# Patient Record
Sex: FEMALE | Race: WHITE | NOT HISPANIC OR LATINO | Employment: OTHER | URBAN - NONMETROPOLITAN AREA
[De-identification: names, ages, dates, MRNs, and addresses within clinical notes are randomized per-mention and may not be internally consistent; named-entity substitution may affect disease eponyms.]

---

## 2018-02-14 ENCOUNTER — APPOINTMENT (OUTPATIENT)
Dept: OCCUPATIONAL MEDICINE | Facility: OTHER | Age: 58
End: 2018-02-14

## 2018-02-19 ENCOUNTER — APPOINTMENT (OUTPATIENT)
Dept: OCCUPATIONAL MEDICINE | Facility: OTHER | Age: 58
End: 2018-02-19

## 2020-07-19 ENCOUNTER — HOSPITAL ENCOUNTER (EMERGENCY)
Facility: HOSPITAL | Age: 60
Discharge: HOME | End: 2020-07-19
Attending: EMERGENCY MEDICINE | Admitting: EMERGENCY MEDICINE
Payer: COMMERCIAL

## 2020-07-19 VITALS
SYSTOLIC BLOOD PRESSURE: 160 MMHG | DIASTOLIC BLOOD PRESSURE: 99 MMHG | TEMPERATURE: 97 F | RESPIRATION RATE: 20 BRPM | HEART RATE: 94 BPM | OXYGEN SATURATION: 99 %

## 2020-07-19 DIAGNOSIS — Z79.4 TYPE 2 DIABETES MELLITUS WITH OTHER SPECIFIED COMPLICATION, WITH LONG-TERM CURRENT USE OF INSULIN (H): ICD-10-CM

## 2020-07-19 DIAGNOSIS — E11.69 TYPE 2 DIABETES MELLITUS WITH OTHER SPECIFIED COMPLICATION, WITH LONG-TERM CURRENT USE OF INSULIN (H): ICD-10-CM

## 2020-07-19 DIAGNOSIS — H60.21 ACUTE MALIGNANT OTITIS EXTERNA OF RIGHT EAR: ICD-10-CM

## 2020-07-19 PROCEDURE — 99283 EMERGENCY DEPT VISIT LOW MDM: CPT

## 2020-07-19 PROCEDURE — 25000132 ZZH RX MED GY IP 250 OP 250 PS 637: Performed by: EMERGENCY MEDICINE

## 2020-07-19 PROCEDURE — 99282 EMERGENCY DEPT VISIT SF MDM: CPT | Mod: Z6 | Performed by: EMERGENCY MEDICINE

## 2020-07-19 PROCEDURE — 99282 EMERGENCY DEPT VISIT SF MDM: CPT

## 2020-07-19 RX ORDER — SIMVASTATIN 20 MG
20 TABLET ORAL AT BEDTIME
COMMUNITY

## 2020-07-19 RX ORDER — SULFAMETHOXAZOLE/TRIMETHOPRIM 800-160 MG
1 TABLET ORAL ONCE
Status: COMPLETED | OUTPATIENT
Start: 2020-07-19 | End: 2020-07-19

## 2020-07-19 RX ORDER — CIPROFLOXACIN AND DEXAMETHASONE 3; 1 MG/ML; MG/ML
4 SUSPENSION/ DROPS AURICULAR (OTIC) 2 TIMES DAILY
Qty: 1 BOTTLE | Refills: 0 | Status: SHIPPED | OUTPATIENT
Start: 2020-07-19 | End: 2020-07-19

## 2020-07-19 RX ORDER — SULFAMETHOXAZOLE/TRIMETHOPRIM 800-160 MG
1 TABLET ORAL 2 TIMES DAILY
Qty: 14 TABLET | Refills: 0 | Status: SHIPPED | OUTPATIENT
Start: 2020-07-19

## 2020-07-19 RX ORDER — SULFAMETHOXAZOLE/TRIMETHOPRIM 800-160 MG
1 TABLET ORAL 2 TIMES DAILY
Status: DISCONTINUED | OUTPATIENT
Start: 2020-07-19 | End: 2020-07-19

## 2020-07-19 RX ORDER — CIPROFLOXACIN AND DEXAMETHASONE 3; 1 MG/ML; MG/ML
4 SUSPENSION/ DROPS AURICULAR (OTIC) ONCE
Status: DISCONTINUED | OUTPATIENT
Start: 2020-07-19 | End: 2020-07-19 | Stop reason: HOSPADM

## 2020-07-19 RX ORDER — CIPROFLOXACIN AND DEXAMETHASONE 3; 1 MG/ML; MG/ML
4 SUSPENSION/ DROPS AURICULAR (OTIC) 2 TIMES DAILY
Qty: 1 BOTTLE | Refills: 0 | Status: SHIPPED | OUTPATIENT
Start: 2020-07-19

## 2020-07-19 RX ORDER — LEVOFLOXACIN 500 MG/1
500 TABLET, FILM COATED ORAL DAILY
Qty: 7 TABLET | Refills: 0 | Status: SHIPPED | OUTPATIENT
Start: 2020-07-19

## 2020-07-19 RX ORDER — SULFAMETHOXAZOLE/TRIMETHOPRIM 800-160 MG
1 TABLET ORAL 2 TIMES DAILY
Qty: 14 TABLET | Refills: 0 | Status: SHIPPED | OUTPATIENT
Start: 2020-07-19 | End: 2020-07-19

## 2020-07-19 RX ORDER — LEVOFLOXACIN 750 MG/1
750 TABLET, FILM COATED ORAL ONCE
Status: COMPLETED | OUTPATIENT
Start: 2020-07-19 | End: 2020-07-19

## 2020-07-19 RX ORDER — LEVOFLOXACIN 500 MG/1
500 TABLET, FILM COATED ORAL DAILY
Qty: 7 TABLET | Refills: 0 | Status: SHIPPED | OUTPATIENT
Start: 2020-07-19 | End: 2020-07-19

## 2020-07-19 RX ADMIN — LEVOFLOXACIN 750 MG: 750 TABLET, FILM COATED ORAL at 08:36

## 2020-07-19 RX ADMIN — SULFAMETHOXAZOLE AND TRIMETHOPRIM 1 TABLET: 800; 160 TABLET ORAL at 08:36

## 2020-07-19 ASSESSMENT — ENCOUNTER SYMPTOMS
COUGH: 0
EYE ITCHING: 0
FEVER: 0
COLOR CHANGE: 1
CHILLS: 0
WEAKNESS: 0
SHORTNESS OF BREATH: 0
FACIAL SWELLING: 1

## 2020-07-19 NOTE — DISCHARGE INSTRUCTIONS
It is ok to take ibuprofen. Finish the antibiotics. Return if more swelling or fever, stiff neck or worse. Advise taking the earrings out. Maintain a very good diet for your DM to keep glucose as low as possible and check three times a day  Use the ciprodex in the ear twice a day to saturate the wick. Have your doctor remove wick at the end of the week.

## 2020-07-19 NOTE — ED AVS SNAPSHOT
[facility]  [address] [address]  Phone:  [phone]                                    Linda Benoit   MRN: [id number]    Department:  [facility]   Date of Visit:  7/19/2020           After Visit Summary Signature Page    I have received my discharge instructions, and my questions have been answered. I have discussed any challenges I see with this plan with the nurse or doctor.    ..........................................................................................................................................  Patient/Patient Representative Signature      ..........................................................................................................................................  Patient Representative Print Name and Relationship to Patient    ..................................................               ................................................  Date                                   Time    ..........................................................................................................................................  Reviewed by Signature/Title    ...................................................              ..............................................  Date                                               Time          22EPIC Rev 08/18

## 2020-07-19 NOTE — ED NOTES
Will take meds at home after eating. Aware that Ear gtts can be picked up at [facility] when they open. States pain is 5/10. Aware she is to have the ear wick removed by lucie AQUINO at the end of the week.

## 2020-07-19 NOTE — ED NOTES
Per in patient pharmacy we are out of ciprodex ear drops; provider aware.  Patient to start after discharge.

## 2020-07-19 NOTE — ED PROVIDER NOTES
History     Chief Complaint   Patient presents with     Ear Drainage     HPI  Linda Benoit is a 60 year old female who ends with right ear swelling.  There is been some crusty drainage this morning.  She normally takes Aleve but has not taken any yet today.  She does have ibuprofen at home.  She is an NIDDM patient who is on Jardiance and metformin.  Sugars have not been uncontrolled.  She has some swelling in the neck also.  She has no history of upper facial swelling and there is some decreased hearing.  She has no difficulty opening her jaw.  She is never had osteomyelitis and she has no sore throat or dysphagia.  There is no cough shortness of breath or chest pain noted by the patient she had this same thing several years ago and was placed on systemic antibiotics.  She does have a local physician with whom she can follow-up.    Allergies:  No Known Allergies    Problem List:    There are no active problems to display for this patient.       Past Medical History:    No past medical history on file.    Past Surgical History:    No past surgical history on file.    Family History:    No family history on file.    Social History:  Marital Status:   [2]  Social History     Tobacco Use     Smoking status: Not on file   Substance Use Topics     Alcohol use: Not on file     Drug use: Not on file        Medications:    empagliflozin (JARDIANCE) 10 MG TABS tablet  metFORMIN (GLUCOPHAGE) 1000 MG tablet  omeprazole (PRILOSEC) 20 MG DR capsule  simvastatin (ZOCOR) 20 MG tablet          Review of Systems   Constitutional: Negative for chills and fever.   HENT: Positive for ear discharge, ear pain, facial swelling and hearing loss. Negative for drooling, mouth sores and sneezing.    Eyes: Negative for itching.   Respiratory: Negative for cough and shortness of breath.    Cardiovascular: Negative for chest pain.   Skin: Positive for color change.   Allergic/Immunologic: Positive for immunocompromised state.    Neurological: Negative for weakness.       Physical Exam   BP: 160/99  Pulse: 94  Temp: 97  F (36.1  C)  Resp: 20  SpO2: 99 %      Physical Exam  Vitals signs and nursing note reviewed.   Constitutional:       General: She is in acute distress.      Appearance: She is not toxic-appearing or diaphoretic.   HENT:      Head: Normocephalic.      Left Ear: Tympanic membrane and ear canal normal.      Ears:      Comments: Right tympanic membrane is visible.  However, there is near occlusion of the canal.  The entire pinna is swollen.  There is some crusting.  There are no bleeding sites.     Nose: Nose normal. No rhinorrhea.      Mouth/Throat:      Mouth: Mucous membranes are moist.      Pharynx: Oropharynx is clear. No oropharyngeal exudate or posterior oropharyngeal erythema.   Eyes:      General:         Right eye: No discharge.         Left eye: No discharge.      Extraocular Movements: Extraocular movements intact.      Conjunctiva/sclera: Conjunctivae normal.      Pupils: Pupils are equal, round, and reactive to light.   Neck:      Musculoskeletal: Neck supple. Muscular tenderness present.      Vascular: No carotid bruit.      Comments: Patient has some swelling at the angle of the jaw which measure about 4 cm x 3 cm and indurated.  She has no JVD however.No stridor or dysphonia.  Cardiovascular:      Rate and Rhythm: Normal rate and regular rhythm.      Heart sounds: No murmur. Friction rub present.   Pulmonary:      Effort: No respiratory distress.      Breath sounds: No wheezing or rhonchi.   Chest:      Chest wall: No tenderness.   Abdominal:      General: Abdomen is flat.   Musculoskeletal: Normal range of motion.   Skin:     General: Skin is warm and dry.   Neurological:      General: No focal deficit present.      Mental Status: She is alert. Mental status is at baseline.   Psychiatric:         Mood and Affect: Mood normal.         Thought Content: Thought content normal.         Judgment: Judgment normal.          ED Course        Procedures  With the patient laying in the left lateral decubitus I placed a wick in the right ear.  We will be placing Ciprodex drops in her ear twice daily to saturate.  She can return at any time if she does have the ability to make an appointment see her doctor to get the wick out earlier in the week I will continue her on Levaquin and Bactrim to cover Pseudomonas as well as staph respectively.  I told her if she gets more swelling or cannot swallow or drools or gets shaking chills or fever she needs to return immediately.             pt is to get ciprodex as an outpt as we do not have here.       No results found for this or any previous visit (from the past 24 hour(s)).    Medications   ciprofloxacin-dexamethasone (CIPRODEX) 0.3-0.1 % otic suspension 4 drop (has no administration in time range)   levofloxacin (LEVAQUIN) tablet 750 mg (has no administration in time range)   sulfamethoxazole-trimethoprim (BACTRIM DS) 800-160 MG per tablet 1 tablet (has no administration in time range)       Assessments & Plan (with Medical Decision Making)     I have reviewed the nursing notes.    I have reviewed the findings, diagnosis, plan and need for follow up with the patient.      New Prescriptions    No medications on file     1. 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 Type 2 diabetes mellitus with other specified complication, with long-term current use of insulin (H)          Final diagnoses:   None       7/19/2020   [facility]     Fiordaliza Nicole MD  07/19/20 [id number]

## 2023-09-25 ENCOUNTER — MEDICAL CORRESPONDENCE (OUTPATIENT)
Dept: NUCLEAR MEDICINE | Facility: HOSPITAL | Age: 63
End: 2023-09-25

## 2023-10-02 ENCOUNTER — HOSPITAL ENCOUNTER (OUTPATIENT)
Dept: NUCLEAR MEDICINE | Facility: HOSPITAL | Age: 63
Setting detail: NUCLEAR MEDICINE
Discharge: HOME | End: 2023-10-02
Attending: FAMILY MEDICINE
Payer: COMMERCIAL

## 2023-10-02 ENCOUNTER — HOSPITAL ENCOUNTER (OUTPATIENT)
Dept: CARDIOLOGY | Facility: HOSPITAL | Age: 63
Setting detail: NUCLEAR MEDICINE
Discharge: HOME | End: 2023-10-02
Attending: FAMILY MEDICINE
Payer: COMMERCIAL

## 2023-10-02 DIAGNOSIS — R94.31 ABNORMAL ELECTROCARDIOGRAM: ICD-10-CM

## 2023-10-02 LAB
CV BLOOD PRESSURE: 76 MMHG
CV STRESS MAX HR HE: 95
NUC STRESS EJECTION FRACTION: 73 %
RATE PRESSURE PRODUCT: NORMAL
STRESS ECHO BASELINE DIASTOLIC HE: 90
STRESS ECHO BASELINE HR: 68 BPM
STRESS ECHO BASELINE SYSTOLIC BP: 138
STRESS ECHO CALCULATED PERCENT HR: 61 %
STRESS ECHO LAST STRESS DIASTOLIC BP: 82
STRESS ECHO LAST STRESS SYSTOLIC BP: 118
STRESS ECHO TARGET HR: 157

## 2023-10-02 PROCEDURE — 343N000001 HC RX 343: Performed by: RADIOLOGY

## 2023-10-02 PROCEDURE — 93017 CV STRESS TEST TRACING ONLY: CPT

## 2023-10-02 PROCEDURE — A9500 TC99M SESTAMIBI: HCPCS | Performed by: RADIOLOGY

## 2023-10-02 PROCEDURE — 93018 CV STRESS TEST I&R ONLY: CPT | Performed by: INTERNAL MEDICINE

## 2023-10-02 PROCEDURE — 250N000011 HC RX IP 250 OP 636: Performed by: INTERNAL MEDICINE

## 2023-10-02 PROCEDURE — 93016 CV STRESS TEST SUPVJ ONLY: CPT | Performed by: INTERNAL MEDICINE

## 2023-10-02 PROCEDURE — 78452 HT MUSCLE IMAGE SPECT MULT: CPT

## 2023-10-02 RX ORDER — REGADENOSON 0.08 MG/ML
0.4 INJECTION, SOLUTION INTRAVENOUS ONCE
Status: COMPLETED | OUTPATIENT
Start: 2023-10-02 | End: 2023-10-02

## 2023-10-02 RX ORDER — AMINOPHYLLINE 25 MG/ML
INJECTION, SOLUTION INTRAVENOUS
Status: DISCONTINUED
Start: 2023-10-02 | End: 2023-10-02 | Stop reason: WASHOUT

## 2023-10-02 RX ADMIN — Medication 10.2 MILLICURIE: at 07:14

## 2023-10-02 RX ADMIN — REGADENOSON 0.4 MG: 0.08 INJECTION, SOLUTION INTRAVENOUS at 08:56

## 2023-10-02 RX ADMIN — Medication 31.4 MILLICURIE: at 08:56

## 2025-01-22 ENCOUNTER — TRANSFERRED RECORDS (OUTPATIENT)
Dept: MULTI SPECIALTY CLINIC | Facility: CLINIC | Age: 65
End: 2025-01-22

## 2025-01-22 LAB — RETINOPATHY: NORMAL

## 2025-01-30 ENCOUNTER — TRANSFERRED RECORDS (OUTPATIENT)
Dept: HEALTH INFORMATION MANAGEMENT | Facility: HOSPITAL | Age: 65
End: 2025-01-30

## 2025-01-30 LAB
ALBUMIN (EXTERNAL): 4.5 G/DL (ref 3.5–5)
ALBUMIN (URINE) MG/SPEC: <3 MG/L
ALKALINE PHOSPHATASE (EXTERNAL): 84 U/L (ref 32–104)
ALT SERPL-CCNC: 28 U/L (ref 18–65)
ANION GAP SERPL CALC-SCNC: 8 MMOL/L (ref 5–15)
AST SERPL-CCNC: 19 U/L (ref 10–30)
BILIRUB SERPL-MCNC: 0.3 MG/DL (ref 0.2–1)
BUN SERPL-MCNC: 18 MG/DL (ref 8–23)
CALCIUM (EXTERNAL): 9.7 MG/DL (ref 8.5–10.5)
CHLORIDE (EXTERNAL): 109 MMOL/L (ref 98–107)
CHOLESTEROL (EXTERNAL): 178 MG/DL
CO2 (EXTERNAL): 26 MMOL/L (ref 23–32)
CREATININE (EXTERNAL): 0.5 MG/DL (ref 0.7–1.2)
CREATININE (URINE): 49.3 MG/DL
GFR ESTIMATED (EXTERNAL): >90 ML/MIN/1.73M2
GFR ESTIMATED (IF AFRICAN AMERICAN) (EXTERNAL): ABNORMAL ML/MIN/1.73M2
GLUCOSE (EXTERNAL): 156 MG/DL (ref 70–100)
HBA1C MFR BLD: 8.1 %
HDLC SERPL-MCNC: 48 MG/DL
LDL CHOLESTEROL CALCULATED (EXTERNAL): 104 MG/DL
POTASSIUM (EXTERNAL): 4.5 MMOL/L (ref 3.5–5.1)
PROTEIN TOTAL (EXTERNAL): 6.9 G/DL (ref 6–8)
SODIUM (EXTERNAL): 143 MMOL/L (ref 136–145)
TRIGLYCERIDES (EXTERNAL): 131 MG/DL
TSH SERPL-ACNC: 2.16 UIU/ML (ref 0.35–4.8)

## 2025-04-30 DIAGNOSIS — E11.9 TYPE 2 DIABETES MELLITUS WITHOUT COMPLICATION, WITHOUT LONG-TERM CURRENT USE OF INSULIN (H): Primary | ICD-10-CM

## 2025-04-30 RX ORDER — EMPAGLIFLOZIN 25 MG/1
25 TABLET, FILM COATED ORAL
Qty: 90 TABLET | Refills: 0 | Status: SHIPPED | OUTPATIENT
Start: 2025-04-30

## 2025-04-30 NOTE — TELEPHONE ENCOUNTER
Jardiance       Last Written Prescription Date:  unknown historical   Last Fill Quantity: unknown,   # refills: unknown   Last Office Visit: n/a  Future Office visit:

## 2025-04-30 NOTE — TELEPHONE ENCOUNTER
Sodium Glucose Co-Transport Inhibitor Agents [id number]/30/2025 08:38 AM   Protocol Details Patient has documented A1c within the specified period of time.    Medication is active on med list and the sig matches. RN to manually verify dose and sig if red X/fail.    Recent (6 mo) or future (90 days) visit within the authorizing provider's specialty    Medication indicated for associated diagnosis    Has GFR on file in past 12 months and most recent value is >30    Patient has documented normal Potassium within the last 12 mos.

## 2025-05-20 NOTE — TELEPHONE ENCOUNTER
Levothyroxine (Synthroid/Levothroid) 50 MCG tablet    Last Written Prescription Date:  05/20/2025  Last Fill Quantity: 90,   # refills: 0  Last Office Visit: Cancelled last appointment  Future Office visit:       Routing refill request to provider for review/approval because:  Protocol failed on the Synthroid.

## 2025-05-20 NOTE — TELEPHONE ENCOUNTER
Thyroid Protocol [id number]/20/2025 08:59 AM   Protocol Details Medication is active on med list and the sig matches. RN to manually verify dose and sig if red X/fail.    Recent (12 month) or future (90 days) visit with authorizing provider's specialty (provided they have been seen in the past 15 months)    Medication indicated for associated diagnosis    Normal TSH on file in past 12 months

## 2025-05-21 RX ORDER — LEVOTHYROXINE SODIUM 50 UG/1
TABLET ORAL
Qty: 90 TABLET | Refills: 0 | OUTPATIENT
Start: 2025-05-21

## 2025-05-21 NOTE — TELEPHONE ENCOUNTER
RN adived [facility] to be signing patient medications until patient is seen in this clinic. RN called and updated patient and encouraged her to speak to management at [facility] if she has any issues. Patient verbally stated that she understood.

## 2025-06-02 RX ORDER — SITAGLIPTIN 100 MG/1
TABLET, FILM COATED ORAL
COMMUNITY
Start: 2025-03-04

## 2025-06-02 RX ORDER — NITROGLYCERIN 0.4 MG/1
TABLET SUBLINGUAL
COMMUNITY
Start: 2025-02-20

## 2025-06-02 RX ORDER — TRIAMCINOLONE ACETONIDE 1 MG/G
CREAM TOPICAL
COMMUNITY
Start: 2025-03-19

## 2025-06-02 RX ORDER — FLUOROURACIL 50 MG/G
CREAM TOPICAL
COMMUNITY
Start: 2024-08-06 | End: 2025-06-03

## 2025-06-02 RX ORDER — METOPROLOL SUCCINATE 100 MG/1
TABLET, EXTENDED RELEASE ORAL
COMMUNITY
Start: 2025-04-21

## 2025-06-02 RX ORDER — OMEPRAZOLE 40 MG/1
CAPSULE, DELAYED RELEASE ORAL
COMMUNITY
Start: 2025-04-21

## 2025-06-02 RX ORDER — FUROSEMIDE 40 MG/1
TABLET ORAL
COMMUNITY
Start: 2025-03-22

## 2025-06-02 RX ORDER — ATORVASTATIN CALCIUM 20 MG/1
TABLET, FILM COATED ORAL
COMMUNITY
Start: 2025-05-06

## 2025-06-02 RX ORDER — MUPIROCIN 20 MG/G
OINTMENT TOPICAL
COMMUNITY
Start: 2025-02-25 | End: 2025-06-03

## 2025-06-02 RX ORDER — ESTRADIOL 0.1 MG/G
CREAM VAGINAL
COMMUNITY
Start: 2025-01-30 | End: 2025-06-03

## 2025-06-02 RX ORDER — LEVOTHYROXINE SODIUM 50 UG/1
50 TABLET ORAL
COMMUNITY
Start: 2025-05-21

## 2025-06-02 NOTE — PROGRESS NOTES
"  Assessment & Plan     Type 2 diabetes mellitus without complication, without long-term current use of insulin (H)  Uncontrolled. Last A1C was 8.1, She is on januvia, jardiance and meformin. These are at max doses. She has had several vaginal yeast infections on the jardiance, no severe infection. Never been on GLP1, discussed risk/benefit of these. Recommend diabetes ed. Discussed potential move from januvia to glp1, goal A1C 7 or under.   - Diabetes Education Referral ([facility])  - metFORMIN (GLUCOPHAGE) 1000 MG tablet  Dispense: 180 tablet; Refill: 3    Coronary artery disease due to lipid rich plaque  Angiogram 2023, no stents placed. Followed by [facility]. Will need records. Note she uses lasix 40mg very prn, mostly with travel. No clear hx of CHF>     Hypothyroidism, unspecified  This has been stable. Labs from [facility] reviewed. Dx made on routine labs, no hx of surgery.     GERD, without hemorrhage  On 40mg of prilosec. Previously on 20mg. Consider taper in the future, if able.     Chronic left shoulder pain  Deltoid region. Suspect some shoulder/rotator cuff impingement contributing the deltoid dysfunction. Consider PT. Will set up for ortho, consider injection.   - Orthopedic  Referral    History of nonmelanoma skin cancer  BCC, followed by [facility] every 6 mo    Menopause present / Vitamin D Deficiency  Update dexa. Pt is now on vit D supplement. Will need recheck at annual  - DX Bone Density      BMI  Estimated body mass index is 39.19 kg/m  as calculated from the following:    Height as of this encounter: 1.626 m (5' 4\").    Weight as of this encounter: 103.6 kg (228 lb 4.8 oz).       Follow-up  Return in about 3 months (around 9/3/2025) for Diabetes.    Moncho Mckeon is a 65 year old, presenting for the following health issues:  Diabetes, Lipids, and Hypertension        6/3/2025     1:20 PM   Additional Questions   Roomed by hector hadley     History of Present Illness " "      Reason for visit:  Determine care with new dr   She is taking medications regularly.        Diabetes Follow-up    How often are you checking your blood sugar? One time daily  What time of day are you checking your blood sugars (select all that apply)?  Before and after meals  Have you had any blood sugars above 200?  No  Have you had any blood sugars below 70?  No  What symptoms do you notice when your blood sugar is low?  None  What concerns do you have today about your diabetes? None   Do you have any of these symptoms? (Select all that apply)  Redness, sores, or blisters on feet and Excessive thirst  Have you had a diabetic eye exam in the last 12 months? Yes- Date of last eye exam: January 2025,  Location: [facility]    Hyperlipidemia Follow-Up    Are you regularly taking any medication or supplement to lower your cholesterol?   Yes- lipitor  Are you having muscle aches or other side effects that you think could be caused by your cholesterol lowering medication?  No    Hypertension Follow-up    Do you check your blood pressure regularly outside of the clinic? No   Are you following a low salt diet? Yes  Are your blood pressures ever more than 140 on the top number (systolic) OR more   than 90 on the bottom number (diastolic), for example 140/90? Yes    BP Readings from Last 2 Encounters:   06/04/25 121/80   06/03/25 124/74       Review of Systems  Constitutional, neuro, ENT, endocrine, pulmonary, cardiac, gastrointestinal, genitourinary, musculoskeletal, integument and psychiatric systems are negative, except as otherwise noted.      Objective    /74 (BP Location: Left arm, Patient Position: Sitting, Cuff Size: Adult Regular)   Pulse 64   Temp 97.1  F (36.2  C) (Tympanic)   Resp 18   Ht 1.626 m (5' 4\")   Wt 103.6 kg (228 lb 4.8 oz)   SpO2 96%   BMI 39.19 kg/m    Body mass index is 39.19 kg/m .    Physical Exam   GENERAL: alert and no distress  HENT: ear canals and TM's normal, nose and " mouth without ulcers or lesions  NECK: no adenopathy, no asymmetry, masses, or scars  RESP: lungs clear to auscultation - no rales, rhonchi or wheezes  CV: regular rates and rhythm, normal S1 S2, no S3 or S4, no murmur, click or rub, and no peripheral edema  MS: no gross musculoskeletal defects noted, no edema  SKIN: no suspicious lesions or rashes  NEURO: Normal strength and tone, mentation intact and speech normal  PSYCH: mentation appears normal, affect normal/bright    No results found for any visits on 06/03/25.        Signed Electronically by: Rita Spicer MD    The longitudinal plan of care for the diagnosis(es)/condition(s) as documented were addressed during this visit. Due to the added complexity in care, I will continue to support Linda in the subsequent management and with ongoing continuity of care.

## 2025-06-03 ENCOUNTER — OFFICE VISIT (OUTPATIENT)
Dept: FAMILY MEDICINE | Facility: OTHER | Age: 65
End: 2025-06-03
Attending: FAMILY MEDICINE
Payer: MEDICARE

## 2025-06-03 VITALS
WEIGHT: 228.3 LBS | HEIGHT: 64 IN | RESPIRATION RATE: 18 BRPM | SYSTOLIC BLOOD PRESSURE: 124 MMHG | BODY MASS INDEX: 38.98 KG/M2 | OXYGEN SATURATION: 96 % | HEART RATE: 64 BPM | DIASTOLIC BLOOD PRESSURE: 74 MMHG | TEMPERATURE: 97.1 F

## 2025-06-03 DIAGNOSIS — Z78.0 ASYMPTOMATIC MENOPAUSE: ICD-10-CM

## 2025-06-03 DIAGNOSIS — G89.29 CHRONIC LEFT SHOULDER PAIN: ICD-10-CM

## 2025-06-03 DIAGNOSIS — E11.9 TYPE 2 DIABETES MELLITUS WITHOUT COMPLICATION, WITHOUT LONG-TERM CURRENT USE OF INSULIN (H): Primary | ICD-10-CM

## 2025-06-03 DIAGNOSIS — M25.512 CHRONIC LEFT SHOULDER PAIN: ICD-10-CM

## 2025-06-03 DIAGNOSIS — I25.10 CORONARY ARTERY DISEASE DUE TO LIPID RICH PLAQUE: ICD-10-CM

## 2025-06-03 DIAGNOSIS — I25.83 CORONARY ARTERY DISEASE DUE TO LIPID RICH PLAQUE: ICD-10-CM

## 2025-06-03 DIAGNOSIS — E55.9 VITAMIN D DEFICIENCY: ICD-10-CM

## 2025-06-03 DIAGNOSIS — K21.00 GASTROESOPHAGEAL REFLUX DISEASE WITH ESOPHAGITIS WITHOUT HEMORRHAGE: ICD-10-CM

## 2025-06-03 PROCEDURE — G0463 HOSPITAL OUTPT CLINIC VISIT: HCPCS

## 2025-06-04 ENCOUNTER — OFFICE VISIT (OUTPATIENT)
Dept: ORTHOPEDICS | Facility: OTHER | Age: 65
End: 2025-06-04
Attending: PHYSICIAN ASSISTANT
Payer: MEDICARE

## 2025-06-04 ENCOUNTER — ANCILLARY PROCEDURE (OUTPATIENT)
Dept: GENERAL RADIOLOGY | Facility: OTHER | Age: 65
End: 2025-06-04
Attending: PHYSICIAN ASSISTANT
Payer: MEDICARE

## 2025-06-04 VITALS
SYSTOLIC BLOOD PRESSURE: 121 MMHG | BODY MASS INDEX: 38.93 KG/M2 | HEART RATE: 71 BPM | DIASTOLIC BLOOD PRESSURE: 80 MMHG | TEMPERATURE: 97.6 F | HEIGHT: 64 IN | WEIGHT: 228 LBS | OXYGEN SATURATION: 98 %

## 2025-06-04 DIAGNOSIS — G89.29 CHRONIC LEFT SHOULDER PAIN: ICD-10-CM

## 2025-06-04 DIAGNOSIS — M25.512 CHRONIC LEFT SHOULDER PAIN: ICD-10-CM

## 2025-06-04 DIAGNOSIS — M77.8 DELTOID TENDINITIS OF LEFT SHOULDER: Primary | ICD-10-CM

## 2025-06-04 DIAGNOSIS — M25.512 PAIN IN JOINT OF LEFT SHOULDER: Primary | ICD-10-CM

## 2025-06-04 DIAGNOSIS — T50.Z95A PAIN AT SITE OF VACCINATION: ICD-10-CM

## 2025-06-04 DIAGNOSIS — M25.512 PAIN IN JOINT OF LEFT SHOULDER: ICD-10-CM

## 2025-06-04 DIAGNOSIS — R52 PAIN AT SITE OF VACCINATION: ICD-10-CM

## 2025-06-04 PROCEDURE — 73030 X-RAY EXAM OF SHOULDER: CPT | Mod: TC,LT

## 2025-06-04 PROCEDURE — 73030 X-RAY EXAM OF SHOULDER: CPT | Mod: 26 | Performed by: RADIOLOGY

## 2025-06-04 PROCEDURE — G0463 HOSPITAL OUTPT CLINIC VISIT: HCPCS

## 2025-06-04 ASSESSMENT — PAIN SCALES - GENERAL: PAINLEVEL_OUTOF10: SEVERE PAIN (7)

## 2025-06-04 NOTE — PROGRESS NOTES
[facility]  Patient Name: Linda Benoit  Date of Service: 25  :1960 Age:65 year old Sex: female  MRN: [id number]  Provider: Macro Haas PA-C  OFFICE NOTE    CHIEF COMPLAINT: Linda Benoit is a 65 year old female who complains of LEFT shoulder pain    HISTORY OF PRESENT ILLNESS:  Linda is a pleasant 65 year old right hand dominant retired female who presents to my clinic today as a new patient referred by her PCP for further evaluation and treatment of ongoing left shoulder pain.  She describes insidious onset of left shoulder pain last October after receiving Covid and Flu vaccinations in that shoulder around that time at [facility].  She initially did see her PCP who obtained x-rays which were negative and then referred her to therapy.  She went to therapy at [facility] for 6 weeks and did not get any better.  She has taken Aleve consistently without relief as well.  She recently switched PCPs and was then referred here for further management.    She currently describes constant 6/10 moderate achy pain in the left shoulder near the deltoid insertion.  Her pain is worse with outstretched rotational movements.  She denies any loss of motion or strength.  She denies any associated snapping or popping, neck pain, or radicular type symptoms.  Of note, she has had many basal cell lesions removed from her body over the last few years.  She did have 1 removed on the right upper arm last fall and ended up with a staph infection.  She indicates this was after she had already had onset of left shoulder discomfort.    PAST MEDICAL HISTORY:  There is no problem list on file for this patient.      MEDICATIONS:  Current Outpatient Medications   Medication Sig Dispense Refill    atorvastatin (LIPITOR) 20 MG tablet       furosemide (LASIX) 40 MG tablet       JANUVIA 100 MG tablet       JARDIANCE 25 MG TABS tablet TAKE 1 TABLET BY MOUTH DAILY 90 tablet 0    levothyroxine (SYNTHROID/LEVOTHROID) 50  MCG tablet Take 50 mcg by mouth.      metFORMIN (GLUCOPHAGE) 1000 MG tablet Take 1 tablet (1,000 mg) by mouth 2 times daily (with meals). 180 tablet 3    metoprolol succinate ER (TOPROL XL) 100 MG 24 hr tablet       nitroGLYcerin (NITROSTAT) 0.4 MG sublingual tablet       omeprazole (PRILOSEC) 40 MG DR capsule       triamcinolone (KENALOG) 0.1 % external cream        No current facility-administered medications for this visit.       ALLERGIES:  Allergies   Allergen Reactions    Latex        SOCIAL HISTORY:  [address]  [place] [address]  Social History     Socioeconomic History    Marital status:      Spouse name: Not on file    Number of children: Not on file    Years of education: Not on file    Highest education level: Not on file   Occupational History    Not on file   Tobacco Use    Smoking status: Never    Smokeless tobacco: Never   Vaping Use    Vaping status: Never Used   Substance and Sexual Activity    Alcohol use: Not on file    Drug use: Not on file    Sexual activity: Not on file   Other Topics Concern    Not on file   Social History Narrative    Not on file     Social Drivers of Health     Financial Resource Strain: Low Risk  (6/3/2025)    Financial Resource Strain     Within the past 12 months, have you or your family members you live with been unable to get utilities (heat, electricity) when it was really needed?: No   Food Insecurity: Low Risk  (6/3/2025)    Food Insecurity     Within the past 12 months, did you worry that your food would run out before you got money to buy more?: No     Within the past 12 months, did the food you bought just not last and you didn t have money to get more?: No   Transportation Needs: Low Risk  (6/3/2025)    Transportation Needs     Within the past 12 months, has lack of transportation kept you from medical appointments, getting your medicines, non-medical meetings or appointments, work, or from getting things that you need?: No   Physical Activity: Not on  "file   Stress: Not on file   Social Connections: Not on file   Interpersonal Safety: Low Risk  (6/3/2025)    Interpersonal Safety     Do you feel physically and emotionally safe where you currently live?: Yes     Within the past 12 months, have you been hit, slapped, kicked or otherwise physically hurt by someone?: No     Within the past 12 months, have you been humiliated or emotionally abused in other ways by your partner or ex-partner?: No   Housing Stability: Low Risk  (6/3/2025)    Housing Stability     Do you have housing? : Yes     Are you worried about losing your housing?: No       FAMILY HISTORY: Noncontributory.     REVIEW OF SYSTEMS: See HPI.    PHYSICAL EXAM:  Vitals: /80 (BP Location: Left arm, Patient Position: Sitting, Cuff Size: Adult Large)   Pulse 71   Temp 97.6  F (36.4  C) (Tympanic)   Ht 1.626 m (5' 4\")   Wt 103.4 kg (228 lb)   SpO2 98%   BMI 39.14 kg/m    Healthy appearing  66 yo female in NAD.  She is alert and oriented x 4.  She is pleasant and cooperative, has appropriate mood and affect.  Upon inspection, there is no gross deformity or atrophy about the left shoulder and upper extremity.  Skin is clean dry and intact throughout the left shoulder and upper extremity.  No obvious erythema, rashes or lesions.  Skin is normothermic throughout.  No appreciable edema about the left shoulder.  On palpation, she has moderate discomfort at the deltoid insertion and tubercle.  This includes the adjacent soft tissues and deltoid muscle.  No masses palpable.  Shoulder exam today with reveals full and equal active range of motion bilaterally at 170/160/90/40/T8.  She has positive Bloom impingement sign.  Equivocal [place]'s test with deltoid insertional pain.  She demonstrates 5/5 strength of her rotator cuff throughout along with 5/5 strength of her deltoid and biceps.  She has slight discomfort with deltoid resistance.  Elbow exam reveals range of motion to be within normal limits.  She " indicates intact sensation of soft touch in an axillary, musculocutaneous, radial, ulnar, and median nerve distribution left upper extremity.  She has 2+ radial pulse left upper extremity.    IMAGING: New x-rays of the patient's left shoulder were obtained today, reviewed and interpreted.  X-rays show no fracture or dislocation.  Very small calcification seen adjacent to the greater tuberosity.  Question of additional calcification within the deltoid.  Moderate AC arthrosis noted.  Type II acromion.  Acromiohumeral and glenohumeral spaces well maintained.     ASSESSMENT AND PLAN: Linda Benoit is a 65 year old female with history, physical exam and diagnostic images consistent with left shoulder pain and deltoid tendinitis/enthesopathy.  She may also have an element of underlying impingement syndrome.  Differential also includes pain syndrome post vaccinations.    I had a discussion today with the patient in regards to her diagnosis as well as her prognosis.  We also discussed treatment options.  We discussed the option of additional physical therapy to focus on modalities including iontophoresis in the region of the deltoid insertion.  We also discussed the option of subacromial corticosteroid injection to help differentiate whether her symptoms were more related to referred pain stemming from impingement syndrome versus true deltoid enthesopathy.  We also discussed the option of proceeding with MRI evaluation due to the chronicity of her symptoms and limited relief with any treatment thus far.  This would also help us bren in on a more specific region to focus our treatment recommendations.  After a lengthy discussion, she elected to proceed with MRI evaluation of the left shoulder.  Once completed, I will contact her via phone to discuss the results and a more definitive treatment plan thereafter.  She appeared to understand and was in agreement with her plan.  All questions were answered to her  satisfaction.    Marco Haas PA-C  Orthopedics

## 2025-06-05 PROBLEM — E55.9 VITAMIN D DEFICIENCY: Status: ACTIVE | Noted: 2025-06-05

## 2025-06-05 PROBLEM — K21.00 GASTROESOPHAGEAL REFLUX DISEASE WITH ESOPHAGITIS WITHOUT HEMORRHAGE: Status: ACTIVE | Noted: 2025-06-05

## 2025-06-05 PROBLEM — E11.9 TYPE 2 DIABETES MELLITUS WITHOUT COMPLICATION, WITHOUT LONG-TERM CURRENT USE OF INSULIN (H): Status: ACTIVE | Noted: 2025-06-05

## 2025-06-05 PROBLEM — I25.83 CORONARY ARTERY DISEASE DUE TO LIPID RICH PLAQUE: Status: ACTIVE | Noted: 2025-06-05

## 2025-06-05 PROBLEM — I25.10 CORONARY ARTERY DISEASE DUE TO LIPID RICH PLAQUE: Status: ACTIVE | Noted: 2025-06-05

## 2025-06-23 ENCOUNTER — HOSPITAL ENCOUNTER (OUTPATIENT)
Dept: MRI IMAGING | Facility: HOSPITAL | Age: 65
Discharge: HOME | End: 2025-06-23
Attending: PHYSICIAN ASSISTANT | Admitting: RADIOLOGY
Payer: MEDICARE

## 2025-06-23 DIAGNOSIS — G89.29 CHRONIC LEFT SHOULDER PAIN: ICD-10-CM

## 2025-06-23 DIAGNOSIS — M25.512 CHRONIC LEFT SHOULDER PAIN: ICD-10-CM

## 2025-06-23 DIAGNOSIS — M77.8 DELTOID TENDINITIS OF LEFT SHOULDER: ICD-10-CM

## 2025-06-23 PROCEDURE — 73221 MRI JOINT UPR EXTREM W/O DYE: CPT | Mod: 26 | Performed by: RADIOLOGY

## 2025-06-23 PROCEDURE — 73221 MRI JOINT UPR EXTREM W/O DYE: CPT | Mod: LT

## 2025-06-24 ENCOUNTER — HOSPITAL ENCOUNTER (OUTPATIENT)
Dept: EDUCATION SERVICES | Facility: HOSPITAL | Age: 65
Discharge: HOME | End: 2025-06-24
Attending: FAMILY MEDICINE
Payer: MEDICARE

## 2025-06-24 VITALS
OXYGEN SATURATION: 95 % | DIASTOLIC BLOOD PRESSURE: 87 MMHG | RESPIRATION RATE: 16 BRPM | HEART RATE: 74 BPM | BODY MASS INDEX: 40.4 KG/M2 | SYSTOLIC BLOOD PRESSURE: 130 MMHG | HEIGHT: 63 IN | WEIGHT: 228 LBS

## 2025-06-24 DIAGNOSIS — E11.9 TYPE 2 DIABETES MELLITUS WITHOUT COMPLICATION, WITHOUT LONG-TERM CURRENT USE OF INSULIN (H): Primary | ICD-10-CM

## 2025-06-24 LAB — HBA1C MFR BLD: 8.2 % (ref 4.3–?)

## 2025-06-24 PROCEDURE — G0108 DIAB MANAGE TRN  PER INDIV: HCPCS

## 2025-06-24 PROCEDURE — 83036 HEMOGLOBIN GLYCOSYLATED A1C: CPT | Performed by: FAMILY MEDICINE

## 2025-06-24 ASSESSMENT — PAIN SCALES - GENERAL: PAINLEVEL_OUTOF10: MODERATE PAIN (5)

## 2025-06-24 NOTE — PROGRESS NOTES
Diabetes Self-Management Education & Support    Presents for: Individual review    Type of Service: In Person Visit    Assessment  Ramila, 65 year old, referred to [facility] for help with diabetes management. Medication options.   Currently taking max doses: Januvia. Jardiance. Metformin. Considering GLP. Has some concerns about the possible side effects.     Has established care with Dr. Spicer, seen Dr Holguin prior.  Plans to update A1C- update in September. - decided would like to update today.     Cardiology Dr Diggs in [facility] couple years ago. CAD, lipid rich plaque.     Breakfast- usually coffee with sugar free creamer.   Lunch- small meal. Frozen meal. Tortillas and cheese. Whole grain sandwich.   Dinner- main meal. More chicken. Veggies. Burger- may skip bun   More veg now.   Snack- granola. Or low carb option- fruit or greek yogurt. Belvita or muffing.     Drinking lots of water.   Coffee.     Air fryer. Doesn't bake.     Walks -4 days/week for about 30 minutes.   Getting a new puppy- plans for more walking/activity.     Dm- some days tired.   Thirst- drinks water- Jardiance and Lasix- occasional.    Water bottle/4-5 times a day 20 ounce     Vision good cataract surgery in past.     A lot of yeast infections- Jardiance.  Not severe but has annoyance with.     Contour meter. Has supplies. Didn't bring meter today.     A1C:  8.2%    Target A1C: <7.5%, if able, <7.0%  Previous A1C: 8.1- January 30th.       BP meeting ADA target guideline. Statin use. ASA use, no-  defer to PCP for recommendations.  Tobacco use- no.    Foot exam due- denies concerns.  Eye exam- January. Location Dr Morales.   PCP: Dr Spicer    PCP follow up- n scheduled   Insurance Coverage: Medicare/[employer].     Refills needed: none today     Discussed standards of care for diabetes, the importance of early treatment and prevention of cardiovascular risks.     Labs: CMP Microalbumin Lipids within the last year.     Allergies   Allergen  "Reactions    Latex     Chlorine Rash    Nylon Rash    Wool Fiber Rash    Zoster Vaccine Live Rash      Wt Readings from Last 10 Encounters:   06/24/25 103.4 kg (228 lb)   06/04/25 103.4 kg (228 lb)   06/03/25 103.6 kg (228 lb 4.8 oz)       Patient's most recent No results found for: \"A1C\", \"HEMOGLOBINA1\"  is not meeting goal of <7.5%    Diabetes knowledge and skills assessment:   Patient is knowledgeable in diabetes management concepts related to: Healthy Eating, Being Active, Monitoring, and Taking Medication    Based on learning assessment above, most appropriate setting for further diabetes education would be: Individual setting.    Care Plan and Education Provided:  Diabetes Pathophysiology  Healthy Eating: Balanced meals, Consistency in amount and timing of carbohydrate intake, Label reading, Plate planning method, Portion control, and Weight Management  Being Active: Amount recommended (150 minutes moderate or 75 minutes vigorous activity and 2-3 days strength training per week), Finding a physical activity routine that works for you, and Relationship of activity to glucose  Monitoring: Frequency of monitoring and Individual glucose targets  Taking Medication: Side effects of prescribed medication(s) and GLP-1/GIP Instruction - Ozempic and Mounjaro administration technique taught today. Patient verbalized understanding and was able to perform an accurate return demonstration of administration technique. Side effects were discussed, if patient has any abdominal pain, with or without nausea and/or vomiting, stop medication, call provider, clinic or go to the emergency room.  Reducing Risks: Complications of diabetes, Foot care, Goal for A1c, how it relates to glucose and how often to check, and Preventing cardiovascular disease, including blood pressure goals, lipid goals, recommendations for cardioprotective medications, statins, and aspirin  GLP-1 administration technique taught today.     Patient verbalized " understanding of diabetes self-management education concepts discussed, opportunities for ongoing education and support, and recommendations provided today.    Plan    Discussed medication options including pros/cons CV and CKD:  - start a GLP or GLP/GIP- stop Januvia (not taken simultaneously as GLP/GIP). Denies MTC, MEN2.    - Jardiance- to be determined- if yeast infections are affecting qualify of life/not tolerable, consider stopping. Or could try for alternative to see if any better with Farxiga.   - Continue Metformin    Ramila would like to think about her medication options- she is leaning towards Mounjaro but will call to confirm. Provided with eduational materials to help her make informed decision.     Reviewed healthy eating.     Follow up- in clinic: TBD.   Provided contact information.     See Care Plan for co-developed, patient-state behavior change goals.    Education Materials Provided:  -- Ozempic and Mounjaro Medication Information    Subjective/Objective  Linda is an 65 year old, presenting for the following diabetes education related to: Individual review  Accompanied by: Self  Diabetes education in the past 24mo: No  Focus of Visit: Taking Medication  Diabetes type: Type 2  Disease course: Getting harder to manage  How confident are you filling out medical forms by yourself:: Quite a bit  Diabetes management related comments/concerns: review medication options.  Transportation concerns: No  Difficulty affording diabetes medication?: No  Difficulty affording diabetes testing supplies?: No  Other concerns: None  Cultural Influences/Ethnic Background:  Not  or     Diabetes Symptoms & Complications:  Diabetes Related Symptoms: Yeast infection  Weight trend: Stable  Symptom course: Stable  Disease course: Getting harder to manage  Complications assessed today?: Yes  Autonomic neuropathy: No  CVA: No  Heart disease: Yes (CAD. father's side- heart.)  Nephropathy: No  Peripheral  "neuropathy: No  Peripheral Vascular Disease: No    Patient Problem List and Family Medical History reviewed for relevant medical history, current medical status, and diabetes risk factors.    Vitals:  /87   Pulse 74   Resp 16   Ht 1.588 m (5' 2.5\")   Wt 103.4 kg (228 lb)   SpO2 95%   BMI 41.04 kg/m    Estimated body mass index is 41.04 kg/m  as calculated from the following:    Height as of this encounter: 1.588 m (5' 2.5\").    Weight as of this encounter: 103.4 kg (228 lb).   Last 3 BP:   BP Readings from Last 3 Encounters:   06/25/25 118/80   06/24/25 130/87   06/04/25 121/80       History   Smoking Status    Never   Smokeless Tobacco    Never       Labs:  No results found for: \"A1C\", \"HEMOGLOBINA1\"  No results found for: \"GLC\"  No results found for: \"LDL\"  No results found for: \"HDL\"  No results found for: \"GFRESTIMATED\"  No results found for: \"GFRESTBLACK\"  No results found for: \"CR\"  No results found for: \"MICROL\", \"UMALCR\", \"UCRR\"        6/24/2025   Healthy Eating   Healthy Eating Assessed Today Yes   Cultural/Protestant diet restrictions? No   Do you have any food allergies or intolerances? No   Meal planning/habits None   Who cooks/prepares meals for you? Self   Who purchases food in  your home? Self   How many times a week on average do you eat food made away from home (restaurant/take-out)? 2   Meals include Lunch;Dinner   Breakfast coffee- creamer. sugar free.   Lunch frozen dinners. tortillas and cheese. (small meal) sandwich.- whole grain.   Dinner main meal. chicken- lot veggie. or hamburger- skipping bun.   Snacks granola- low carb with fruit or greek yogurt. belivita. muffin.   Beverages Coffee;Water       low carb cranberry juice with 7 up. wine in winter or couple beears.   Has patient met with a dietitian in the past? Yes       [id number] 6/24/2025   Being Active   Being Active Assessed Today Yes   Exercise: Yes       walk-   Days per week of moderate to strenuous exercise (like " a brisk walk) 4   On average, minutes per day of exercise at this level 30   How intense was your typical exercise?  Light (like stretching or slow walking)   Exercise Minutes per Week 120   Barrier to exercise None         6/24/2025   Monitoring   Monitoring Assessed Today Yes   Did patient bring glucose meter to appointment?  No   Blood Glucose Meter ContourNext   Times checking blood sugar at home (number) 3   Times checking blood sugar at home (per) Week     Diabetes Medication(s)       Biguanides       metFORMIN (GLUCOPHAGE) 1000 MG tablet Take 1 tablet (1,000 mg) by mouth 2 times daily (with meals).       Dipeptidyl Peptidase-4 (DPP-4) Inhibitors       JANUVIA 100 MG tablet        Sodium-Glucose Co-Transporter 2 (SGLT2) Inhibitors       JARDIANCE 25 MG TABS tablet TAKE 1 TABLET BY MOUTH DAILY              6/24/2025   Taking Medications   Taking Medication Assessed Today Yes   Current Treatments Diet;Oral Medication (taken by mouth)   Problems taking diabetes medications regularly? No   Diabetes medication side effects? Yes       yeast infections.         6/24/2025   Problem Solving   Problem Solving Assessed Today Yes   Is the patient at risk for hypoglycemia? No   Is the patient at risk for DKA? No           6/24/2025   Reducing Risks   Reducing Risks Assessed Today Yes   Diabetes Risks Age over 45 years   CAD Risks Diabetes Mellitus;Obesity;Post-menopausal   Has dilated eye exam at least once a year? Yes       in January.   Sees dentist every 6 months? Yes   Feet checked by healthcare provider in the last year? Yes         6/24/2025   Healthy Coping: Diabetes Distress Assessment   Healthy Coping Assessed Today Yes   I feel burned out by all of the attention and effort that diabetes demands of me. 2 - A Little Problem   It bothers me that diabetes seems to control my life. 2 - A Little Problem   I am frustrated that even when I do what I am supposed to for my diabetes, it doesn't seem to make a difference. 1  - Not a Problem   No matter how hard I try with my diabetes, it feels like it will never be good enough. 1 - Not a Problem   I am so tired of having to worry about diabetes all the time. 2 - A Little Problem   When it comes to my diabetes, I often feel like a failure. 2 - A Little Problem   It depresses me when I realize that my diabetes will likely never go away. 2 - A Little Problem   Living with diabetes is overwhelming for me. 2 - A Little Problem   T2 DDAS Total Score (0 - 1.9 Little or no DD, 2.0 - 2.9 Moderate DD,  3.0+ High DD) 1.8     Janet Mcrae RN [facility]  Time Spent: 60 minutes  Encounter Type: Individual    Any diabetes medication dose changes were made via the [facility] Standing Orders under the patient's referring provider.

## 2025-06-24 NOTE — PATIENT INSTRUCTIONS
"Five Goals to Manage Diabetes:  1) Control Blood Pressure: <140/90, <130/80 if able to safely reach  2) Lower LDL \"bad\" cholesterol  <70   3) Maintain blood sugar- individual targets vary  4) Be tobacco free  5) Take Aspirin as recommended- talk to your primary care provider if this is right for you.     Monitoring:  Your A1C was 8.2 today.   Previous A1C: 8.1/30/2025.  ADA guidelines less than <7.5%/ less than 7.0%-per PCP.       Check blood sugars 1/day. Alternating with fasting in the morning and/or 2 hours after your largest meal are good times to check.    Target blood sugar: Fasting or before meals target is . 2 hours after meal <180.    We only need 50% of these numbers to be within target for your A1c will be within/close to target.    Medications:   Considering Mounjaro 2.5 mg once a week for 4 weeks then increase to 5 mg once a week.   Vs Ozempic 0.25 mg once a week for 4 weeks, then increase to 0.5 mg weekly.     Healthy Eating:  (avoid food/drink allergies or intolerances if you have any in the following list):     diabetesfoodhub.org  Website for diabetes friendly recipes.   [employer] Website for budget friendly recipes.   diabeteseducation.Zoom  healthy eating and other topics to help manage diabetes.      Women: Daily fiber intake    Over 50 years  21 grams    Protein goal (weight/2.2)  X  0.8-1.2.: Daily Target: 83 -124 grams /day.    (The size of a deck of cards is about 21 grams of protein).   Protein for Weight Loss: If you are trying to lose weight, you may need more protein to help with satiety and preserve muscle mass.    Target Carb:   Women 30-45 grams/meal 15-30 grams carbs optional snack.   Less if working towards weight loss.     Suggest having a protein and/or fiber with carb option (slows down how fast sugar/glucose is released into the blood stream causing the spikes).     Mediterranean Lifestyle: Fish/seafood 2 times a week, vegetables, fruit, nuts, legumes, whole " grains, water, regular activity.    Eat a Healthy diet  Eat more vegetables/plants in your diet  Eat healthy fats  Olive oil  Avocados  Eat healthy proteins  Poultry without the skin  Fish  Limit red meat     Limit higher carbohydrate foods such as: rice, breads, cereal, pasta, sweets. (Mindful portion sizes).   Avoid sugary drinks: regular soda/pop, juice, sports drinks. (Sugar free, zero are okay).     Snacks: Try to  work on healthy, low carbohydrate food choices.   Some good snack examples:   -Greek yogurt  -Protein shake  -Few crackers/slice of cheese  -Apple/peanut butter  -Hardboiled egg/wheat thins  -Almonds or alternate nuts with berries  -Steak bites   -Small wrap sandwich  -Adult lunchable  -Serving of oatmeal with nuts or peanut butter   -Smoothie drink with portion of Greek yogurt, ice, fruit of choice  -Trail mix  -fresh veggie  -fruit/berries- pair with protein like Greek yogurt, cheese, cottage cheese, hardboiled egg.    Activity/Exercise:     Any prolonged sedentary/sitting activity should be interrupted every 30 minutes- not only diabetes benefits-it also lowers some cancer risk too!  Consider walking or other form of activity for 5-10 minutes after eating meals.      Adult goal is 150 minutes/week =  30 minutes 5 days of the week.   Aerobic activity 150 minute a week  2 days of resistance exercising     Start low and slow, work up to 30 min, 5x/week.   Increase exercise as tolerated, even multiple short times during your day helps.   A SMART Goal can help support your journey.      Healthy Coping:     Practicing health promotion can help improve diabetes (suggested by the ADA, March 2019)              Meditation                          Apps: for IPhone and [employer]                          -Calm                          -Headspace                          -Insight Timer              Gentle stretching              Mindful eating       Other:   Foot exam: yearly. In the meantime, check your feet  daily looking for new blisters or wounds, wearing shoes at all times when walking including around the house, and avoiding lotion application between the toes. If there are any signs of infection, contact your primary care provider.  Infections of the foot can be life threatening or lead to amputations of the foot or leg. You can use a mirror to look over your feet is you are not able to bend to get a good look.     Eye exam: yearly    Encourage regular dental check ups.    Consider setting a goal: (SMART) specific, measurable, attainable, realistic and Timely) goals (suggested by ADA 2023)      Follow up: Let me know what you decide :)   Follow up in clinic TBD.      Please bring your meter/reader to your appointment.      If you have any questions or concerns, please call me at [phone] (Tammi Nurse directly) or send [employer] message.    Scheduling Number: [phone]    Thank you for coming in today!  Janet Mcrae RN [facility]  Diabetes Educator

## 2025-06-25 ENCOUNTER — OFFICE VISIT (OUTPATIENT)
Dept: ORTHOPEDICS | Facility: OTHER | Age: 65
End: 2025-06-25
Attending: ORTHOPAEDIC SURGERY
Payer: MEDICARE

## 2025-06-25 VITALS
OXYGEN SATURATION: 97 % | HEIGHT: 63 IN | WEIGHT: 228 LBS | BODY MASS INDEX: 40.4 KG/M2 | SYSTOLIC BLOOD PRESSURE: 118 MMHG | HEART RATE: 67 BPM | DIASTOLIC BLOOD PRESSURE: 80 MMHG | TEMPERATURE: 97.3 F

## 2025-06-25 DIAGNOSIS — M67.919 DISORDER OF BURSAE AND TENDONS IN SHOULDER REGION: Primary | ICD-10-CM

## 2025-06-25 DIAGNOSIS — M71.9 DISORDER OF BURSAE AND TENDONS IN SHOULDER REGION: Primary | ICD-10-CM

## 2025-06-25 PROCEDURE — G0463 HOSPITAL OUTPT CLINIC VISIT: HCPCS | Mod: 25

## 2025-06-25 PROCEDURE — 250N000011 HC RX IP 250 OP 636: Performed by: ORTHOPAEDIC SURGERY

## 2025-06-25 PROCEDURE — 20610 DRAIN/INJ JOINT/BURSA W/O US: CPT | Performed by: ORTHOPAEDIC SURGERY

## 2025-06-25 RX ORDER — BETAMETHASONE SODIUM PHOSPHATE AND BETAMETHASONE ACETATE 3; 3 MG/ML; MG/ML
12 INJECTION, SUSPENSION INTRA-ARTICULAR; INTRALESIONAL; INTRAMUSCULAR; SOFT TISSUE ONCE
Status: COMPLETED | OUTPATIENT
Start: 2025-06-25 | End: 2025-06-25

## 2025-06-25 RX ADMIN — BETAMETHASONE SODIUM PHOSPHATE AND BETAMETHASONE ACETATE 12 MG: 3; 3 INJECTION, SUSPENSION INTRA-ARTICULAR; INTRALESIONAL; INTRAMUSCULAR at 09:41

## 2025-06-25 ASSESSMENT — PAIN SCALES - GENERAL: PAINLEVEL_OUTOF10: MODERATE PAIN (5)

## 2025-06-25 NOTE — PROGRESS NOTES
ORTHOPEDIC CLINIC CONSULT    Referred by: Primary Care Providers:  Rita Spicer MD, MD (General)    Chief Complaint: Linda Benoit is a 65 year old female who is being seen for   Chief Complaint   Patient presents with    Musculoskeletal Problem     Left shoulder recheck/ MRI results        History of Present Illness:   ***    Patient's past medical, surgical, social and family histories reviewed.     History reviewed. No pertinent past medical history.    History reviewed. No pertinent surgical history.    Home Medications:  Prior to Admission medications    Medication Sig Start Date End Date Taking? Authorizing Provider   atorvastatin (LIPITOR) 20 MG tablet  5/6/25  Yes Reported, Patient   furosemide (LASIX) 40 MG tablet  3/22/25  Yes Reported, Patient   JANUVIA 100 MG tablet  3/4/25  Yes Reported, Patient   JARDIANCE 25 MG TABS tablet TAKE 1 TABLET BY MOUTH DAILY 4/30/25  Yes Karena Lafleur APRN CNP   levothyroxine (SYNTHROID/LEVOTHROID) 50 MCG tablet Take 50 mcg by mouth. 5/21/25  Yes Reported, Patient   metFORMIN (GLUCOPHAGE) 1000 MG tablet Take 1 tablet (1,000 mg) by mouth 2 times daily (with meals). 6/3/25  Yes Rita Spicer MD   metoprolol succinate ER (TOPROL XL) 100 MG 24 hr tablet  4/21/25  Yes Reported, Patient   nitroGLYcerin (NITROSTAT) 0.4 MG sublingual tablet  2/20/25  Yes Reported, Patient   omeprazole (PRILOSEC) 40 MG DR capsule  4/21/25  Yes Reported, Patient   triamcinolone (KENALOG) 0.1 % external cream  3/19/25  Yes Reported, Patient       Allergies   Allergen Reactions    Latex     Chlorine Rash    Nylon Rash    Wool Fiber Rash    Zoster Vaccine Live Rash       Social History     Occupational History    Not on file   Tobacco Use    Smoking status: Never    Smokeless tobacco: Never   Vaping Use    Vaping status: Never Used   Substance and Sexual Activity    Alcohol use: Not on file    Drug use: Not on file    Sexual activity: Not on file       History reviewed. No pertinent family  "history.    REVIEW OF SYSTEMS            Physical Exam:    Vitals: /80 (BP Location: Left arm, Patient Position: Sitting, Cuff Size: Adult Regular)   Pulse 67   Temp 97.3  F (36.3  C) (Tympanic)   Ht 1.6 m (5' 3\")   Wt 103.4 kg (228 lb)   SpO2 97%   BMI 40.39 kg/m    BMI= Body mass index is 40.39 kg/m .    Radiographs: ***     Independent visualization of the films was made.         Impression:  No diagnosis found.    Plan:    All of the above pertinent physical exam and imaging modalities findings was reviewed with Linda.    Return to clinic in *** weeks.    Further imaging required ***    Time spent with evaluation:  *** minutes    Harmeet Sandoval MD  6/25/2025  9:17 AM        " injection site.      All of the above pertinent physical exam and imaging modalities findings was reviewed with Linda.    Return to clinic in when symptoms recur weeks.    Further imaging required none    Time spent with evaluation:   minutes    Harmeet Sandoval MD  6/25/2025  9:17 AM

## 2025-06-26 ENCOUNTER — RESULTS FOLLOW-UP (OUTPATIENT)
Dept: EDUCATION SERVICES | Facility: HOSPITAL | Age: 65
End: 2025-06-26

## 2025-06-29 PROBLEM — M71.9 DISORDER OF BURSAE AND TENDONS IN SHOULDER REGION: Status: ACTIVE | Noted: 2025-06-29

## 2025-06-29 PROBLEM — M67.919 DISORDER OF BURSAE AND TENDONS IN SHOULDER REGION: Status: ACTIVE | Noted: 2025-06-29

## 2025-06-30 ENCOUNTER — HOSPITAL ENCOUNTER (OUTPATIENT)
Dept: BONE DENSITY | Facility: HOSPITAL | Age: 65
Discharge: HOME | End: 2025-06-30
Attending: FAMILY MEDICINE | Admitting: RADIOLOGY
Payer: MEDICARE

## 2025-06-30 DIAGNOSIS — Z78.0 ASYMPTOMATIC MENOPAUSE: ICD-10-CM

## 2025-06-30 PROCEDURE — 77080 DXA BONE DENSITY AXIAL: CPT

## 2025-06-30 PROCEDURE — 77080 DXA BONE DENSITY AXIAL: CPT | Mod: 26 | Performed by: RADIOLOGY

## 2025-07-01 ENCOUNTER — RESULTS FOLLOW-UP (OUTPATIENT)
Dept: FAMILY MEDICINE | Facility: OTHER | Age: 65
End: 2025-07-01

## 2025-07-01 NOTE — LETTER
July 1, 2025      Linda Angelmariellezach  [address] [place] [address]        Dear ,    We are writing to inform you of your test results.    Normal bone density on imaging     Resulted Orders   DX Bone Density    Narrative    Procedure:  DX AXIAL HIPS/SPINE    History: Female, age 65 years; Asymptomatic menopause    Comparison:  No relevant prior imaging.      Impression    IMPRESSION: Normal bone mineral density.    Hip:  0.905 g/cm2.   T score: 0.5. Baseline examination.    Lumbar spine:  1.201 g/cm2.   T score: 1.4. Baseline examination.    FRAX Score at hip:  0.1  %    FRAX Score for major osteoporotic fracture:  5.5  %        Only the lowest category is reported for each patient per guidelines  of the International Society for Clinical Densitometry.    See attached DXA images and FRAX report for further details.    WHO DIAGNOSTIC GUIDELINES FOR BONE MASS MEASUREMENT:    Normal                        T-Score at or above -1.0    Osteopenia                T-Score between -1.0 and -2.5    Osteoporosis            T-Score at or below -2.5    Providers should consider medical therapies when 10 year probability  of hip fracture is greater than or equal to 3%, or 10 year probability  of major osteoporosis related fracture is greater than or equal to  20%.    MAXIMINO BRASHER MD         SYSTEM ID:  [id number]       If you have any questions or concerns, please call the clinic at the number listed above.       Sincerely,      Rita Spicer MD    Electronically signed

## 2025-07-07 ENCOUNTER — TELEPHONE (OUTPATIENT)
Dept: FAMILY MEDICINE | Facility: OTHER | Age: 65
End: 2025-07-07

## 2025-07-07 NOTE — TELEPHONE ENCOUNTER
Should be seen for sinuses. I can see at 2 on Wed this week. Otherwise covering.     Please see result notes for dexa

## 2025-07-07 NOTE — TELEPHONE ENCOUNTER
Results requested:  Bone Density 6/30/2025    Best number to reach patient at: [phone]     Best time to call patient: Anytime     Send to care team in basket.     9:20 AM    Reason for Call: Phone Call or Overbook    Description: Ramila also wants to speak to the nurse regarding her sinuses that had since Katie 10 and she has been taking over the counter medications and the medication has not taken care of this and if she should be seen?     Was an appointment offered for this call? NO   If yes : Appointment type              Date    Preferred method for responding to this message: Telephone Call  What is your phone number ? [phone]    If we cannot reach you directly, may we leave a detailed response at the number you provided? Yes    Can this message wait until your PCP/provider returns, if available today? No

## 2025-07-09 ENCOUNTER — OFFICE VISIT (OUTPATIENT)
Dept: FAMILY MEDICINE | Facility: OTHER | Age: 65
End: 2025-07-09
Attending: FAMILY MEDICINE
Payer: MEDICARE

## 2025-07-09 VITALS
HEART RATE: 80 BPM | TEMPERATURE: 97.5 F | WEIGHT: 224 LBS | OXYGEN SATURATION: 96 % | BODY MASS INDEX: 39.68 KG/M2 | DIASTOLIC BLOOD PRESSURE: 68 MMHG | SYSTOLIC BLOOD PRESSURE: 130 MMHG

## 2025-07-09 DIAGNOSIS — J01.00 SUBACUTE MAXILLARY SINUSITIS: Primary | ICD-10-CM

## 2025-07-09 PROCEDURE — G0463 HOSPITAL OUTPT CLINIC VISIT: HCPCS

## 2025-07-09 RX ORDER — DOXYCYCLINE HYCLATE 100 MG
100 TABLET ORAL 2 TIMES DAILY
Qty: 20 TABLET | Refills: 0 | Status: SHIPPED | OUTPATIENT
Start: 2025-07-09 | End: 2025-07-19

## 2025-07-09 ASSESSMENT — PAIN SCALES - GENERAL: PAINLEVEL_OUTOF10: NO PAIN (0)

## 2025-07-09 NOTE — PROGRESS NOTES
"  Assessment & Plan     Subacute maxillary sinusitis  21 days of waxing and waning sx. Will treat. Limit sun exposure, monitor for GI side effects.   - doxycycline hyclate (VIBRA-TABS) 100 MG tablet  Dispense: 20 tablet; Refill: 0    BMI  Estimated body mass index is 39.68 kg/m  as calculated from the following:    Height as of 6/25/25: 1.6 m (5' 3\").    Weight as of this encounter: 101.6 kg (224 lb).     Follow-up  No follow-ups on file.    Moncho Mckeon is a 65 year old, presenting for the following health issues:  Sinus Problem        7/9/2025     1:50 PM   Additional Questions   Roomed by Valarie     Sinus Problem     History of Present Illness       Reason for visit:  Sinus problems  Symptom onset:  3-4 weeks ago   She is taking medications regularly.        RESPIRATORY SYMPTOMS    Duration: 1 month  Description  nasal congestion, rhinorrhea, sore throat, facial pain/pressure, cough, fatigue/malaise, and PND  Severity: moderate  Accompanying signs and symptoms: hoarse voice   History (predisposing factors):  none  Precipitating or alleviating factors: None  Therapies tried and outcome:  oral decongestant antihistamine acetaminophen     Review of Systems  Constitutional, neuro, ENT, endocrine, pulmonary, cardiac, gastrointestinal, genitourinary, musculoskeletal, integument and psychiatric systems are negative, except as otherwise noted.      Objective    /68   Pulse 80   Temp 97.5  F (36.4  C) (Tympanic)   Wt 101.6 kg (224 lb)   SpO2 96%   BMI 39.68 kg/m    Body mass index is 39.68 kg/m .    Physical Exam   GENERAL: alert and no distress  EYES: Eyes grossly normal to inspection  HENT: ear canals and TM's normal, nose and mouth without ulcers or lesions  NECK: no adenopathy, no asymmetry, masses, or scars  RESP: lungs clear to auscultation - no rales, rhonchi or wheezes  CV: regular rates and rhythm, normal S1 S2, no S3 or S4, no murmur, click or rub, and no peripheral edema  MS: no gross " musculoskeletal defects noted, no edema  SKIN: no suspicious lesions or rashes  PSYCH: mentation appears normal, affect normal/bright    No results found for any visits on 07/09/25.        Signed Electronically by: Rita Spicer MD    The longitudinal plan of care for the diagnosis(es)/condition(s) as documented were addressed during this visit. Due to the added complexity in care, I will continue to support Linda in the subsequent management and with ongoing continuity of care.

## 2025-07-31 ENCOUNTER — TELEPHONE (OUTPATIENT)
Dept: FAMILY MEDICINE | Facility: OTHER | Age: 65
End: 2025-07-31

## 2025-07-31 DIAGNOSIS — E11.9 TYPE 2 DIABETES MELLITUS WITHOUT COMPLICATION, WITHOUT LONG-TERM CURRENT USE OF INSULIN (H): ICD-10-CM

## 2025-07-31 NOTE — TELEPHONE ENCOUNTER
Reason for call:  Medication    Pt is out  Have you contacted your pharmacy? Yes   If patient has contacted Pharmacy and it has been over 72hrs, continue to #2  Medication  JARDIANCE 25 MG TABS tablet   What Pharmacy do you use? [facility]      (Please note that the turn-around-time for prescriptions is 72 business hours; I am sending your request at this time. SEND TO appropriate Care Team Pool )

## 2025-08-25 ENCOUNTER — TRANSFERRED RECORDS (OUTPATIENT)
Dept: OTHER | Facility: HOSPITAL | Age: 65
End: 2025-08-25

## 2025-08-25 DIAGNOSIS — E03.9 HYPOTHYROIDISM, UNSPECIFIED TYPE: Primary | ICD-10-CM

## 2025-08-25 RX ORDER — LEVOTHYROXINE SODIUM 50 UG/1
50 TABLET ORAL
Qty: 90 TABLET | Refills: 3 | Status: SHIPPED | OUTPATIENT
Start: 2025-08-25